# Patient Record
Sex: MALE | Race: BLACK OR AFRICAN AMERICAN | Employment: UNEMPLOYED | URBAN - METROPOLITAN AREA
[De-identification: names, ages, dates, MRNs, and addresses within clinical notes are randomized per-mention and may not be internally consistent; named-entity substitution may affect disease eponyms.]

---

## 2024-05-18 ENCOUNTER — HOSPITAL ENCOUNTER (EMERGENCY)
Age: 29
Discharge: HOME | End: 2024-05-19
Attending: EMERGENCY MEDICINE

## 2024-05-18 VITALS
HEART RATE: 102 BPM | TEMPERATURE: 97.4 F | RESPIRATION RATE: 26 BRPM | SYSTOLIC BLOOD PRESSURE: 157 MMHG | OXYGEN SATURATION: 97 % | DIASTOLIC BLOOD PRESSURE: 94 MMHG

## 2024-05-18 DIAGNOSIS — T78.40XA ALLERGIC REACTION, INITIAL ENCOUNTER: Primary | ICD-10-CM

## 2024-05-18 PROCEDURE — 2580000003 HC RX 258: Performed by: PHYSICIAN ASSISTANT

## 2024-05-18 PROCEDURE — 96372 THER/PROPH/DIAG INJ SC/IM: CPT

## 2024-05-18 PROCEDURE — 2500000003 HC RX 250 WO HCPCS: Performed by: PHYSICIAN ASSISTANT

## 2024-05-18 PROCEDURE — 96374 THER/PROPH/DIAG INJ IV PUSH: CPT

## 2024-05-18 PROCEDURE — 6360000002 HC RX W HCPCS: Performed by: PHYSICIAN ASSISTANT

## 2024-05-18 PROCEDURE — 96375 TX/PRO/DX INJ NEW DRUG ADDON: CPT

## 2024-05-18 PROCEDURE — 6360000002 HC RX W HCPCS

## 2024-05-18 PROCEDURE — 99284 EMERGENCY DEPT VISIT MOD MDM: CPT

## 2024-05-18 RX ORDER — BENZOCAINE/MENTHOL 6 MG-10 MG
LOZENGE MUCOUS MEMBRANE ONCE
Status: DISCONTINUED | OUTPATIENT
Start: 2024-05-18 | End: 2024-05-19 | Stop reason: HOSPADM

## 2024-05-18 RX ORDER — FAMOTIDINE 10 MG/ML
INJECTION, SOLUTION INTRAVENOUS
Status: DISCONTINUED
Start: 2024-05-18 | End: 2024-05-19 | Stop reason: HOSPADM

## 2024-05-18 RX ORDER — METHYLPREDNISOLONE SODIUM SUCCINATE 125 MG/2ML
INJECTION, POWDER, LYOPHILIZED, FOR SOLUTION INTRAMUSCULAR; INTRAVENOUS
Status: DISCONTINUED
Start: 2024-05-18 | End: 2024-05-19 | Stop reason: HOSPADM

## 2024-05-18 RX ORDER — 0.9 % SODIUM CHLORIDE 0.9 %
1000 INTRAVENOUS SOLUTION INTRAVENOUS ONCE
Status: COMPLETED | OUTPATIENT
Start: 2024-05-18 | End: 2024-05-18

## 2024-05-18 RX ORDER — WATER 10 ML/10ML
INJECTION INTRAMUSCULAR; INTRAVENOUS; SUBCUTANEOUS
Status: DISCONTINUED
Start: 2024-05-18 | End: 2024-05-19 | Stop reason: HOSPADM

## 2024-05-18 RX ORDER — DIPHENHYDRAMINE HYDROCHLORIDE 50 MG/ML
50 INJECTION INTRAMUSCULAR; INTRAVENOUS ONCE
Status: COMPLETED | OUTPATIENT
Start: 2024-05-18 | End: 2024-05-18

## 2024-05-18 RX ORDER — EPINEPHRINE 1 MG/ML
INJECTION, SOLUTION INTRAMUSCULAR; SUBCUTANEOUS
Status: COMPLETED
Start: 2024-05-18 | End: 2024-05-18

## 2024-05-18 RX ORDER — EPINEPHRINE 1 MG/ML
0.3 INJECTION, SOLUTION INTRAMUSCULAR; SUBCUTANEOUS ONCE
Status: DISCONTINUED | OUTPATIENT
Start: 2024-05-18 | End: 2024-05-19 | Stop reason: HOSPADM

## 2024-05-18 RX ORDER — EPINEPHRINE 1 MG/ML
0.3 INJECTION, SOLUTION INTRAMUSCULAR; SUBCUTANEOUS ONCE
Status: COMPLETED | OUTPATIENT
Start: 2024-05-18 | End: 2024-05-18

## 2024-05-18 RX ORDER — EPINEPHRINE 0.1 MG/ML
INJECTION INTRAVENOUS
Status: DISCONTINUED
Start: 2024-05-18 | End: 2024-05-19 | Stop reason: WASHOUT

## 2024-05-18 RX ORDER — DIPHENHYDRAMINE HYDROCHLORIDE 50 MG/ML
INJECTION INTRAMUSCULAR; INTRAVENOUS
Status: COMPLETED
Start: 2024-05-18 | End: 2024-05-18

## 2024-05-18 RX ADMIN — SODIUM CHLORIDE 1000 ML: 9 INJECTION, SOLUTION INTRAVENOUS at 22:37

## 2024-05-18 RX ADMIN — DIPHENHYDRAMINE HYDROCHLORIDE 50 MG: 50 INJECTION INTRAMUSCULAR; INTRAVENOUS at 22:35

## 2024-05-18 RX ADMIN — DIPHENHYDRAMINE HYDROCHLORIDE 50 MG: 50 INJECTION, SOLUTION INTRAMUSCULAR; INTRAVENOUS at 22:35

## 2024-05-18 RX ADMIN — EPINEPHRINE 0.3 MG: 1 INJECTION INTRAMUSCULAR; INTRAVENOUS; SUBCUTANEOUS at 22:32

## 2024-05-18 RX ADMIN — WATER 125 MG: 1 INJECTION INTRAMUSCULAR; INTRAVENOUS; SUBCUTANEOUS at 22:35

## 2024-05-18 RX ADMIN — EPINEPHRINE 0.3 MG: 1 INJECTION, SOLUTION INTRAMUSCULAR; SUBCUTANEOUS at 22:32

## 2024-05-18 RX ADMIN — SODIUM CHLORIDE, PRESERVATIVE FREE 20 MG: 5 INJECTION INTRAVENOUS at 22:33

## 2024-05-18 ASSESSMENT — LIFESTYLE VARIABLES
HOW MANY STANDARD DRINKS CONTAINING ALCOHOL DO YOU HAVE ON A TYPICAL DAY: PATIENT DOES NOT DRINK
HOW OFTEN DO YOU HAVE A DRINK CONTAINING ALCOHOL: NEVER

## 2024-05-19 RX ORDER — DIPHENHYDRAMINE HCL 25 MG
25 CAPSULE ORAL EVERY 4 HOURS PRN
Qty: 20 CAPSULE | Refills: 0 | Status: SHIPPED | OUTPATIENT
Start: 2024-05-19 | End: 2024-05-29

## 2024-05-19 RX ORDER — EPINEPHRINE 0.3 MG/.3ML
0.3 INJECTION SUBCUTANEOUS ONCE
Qty: 0.3 ML | Refills: 0 | Status: SHIPPED | OUTPATIENT
Start: 2024-05-19 | End: 2024-05-19

## 2024-05-19 RX ORDER — FAMOTIDINE 20 MG/1
20 TABLET, FILM COATED ORAL 2 TIMES DAILY
Qty: 60 TABLET | Refills: 0 | Status: SHIPPED | OUTPATIENT
Start: 2024-05-19

## 2024-05-19 RX ORDER — PREDNISONE 10 MG/1
60 TABLET ORAL DAILY
Qty: 30 TABLET | Refills: 0 | Status: SHIPPED | OUTPATIENT
Start: 2024-05-19 | End: 2024-05-24

## 2024-05-19 NOTE — ED PROVIDER NOTES
[facility] EMERGENCY DEPARTMENT  EMERGENCY DEPARTMENT ENCOUNTER        Pt Name: Sabas Fernandez  MRN: [id number]  Birthdate 1995  Date of evaluation: 5/18/2024  Provider: Fifi Moore PA-C  PCP: No primary care provider on file.  Note Started: 12:11 AM EDT 5/19/24       I have seen and evaluated this patient with my supervising physician Akil Woodall MD.      CHIEF COMPLAINT       Chief Complaint   Patient presents with    Allergic Reaction     Pt came in from home, pt presents with rash and hives all over body, pt unsure of what they are allergic too.       HISTORY OF PRESENT ILLNESS: 1 or more Elements     History From: Patient  Limitations to history : None    Sabas Fernandez is a 29 y.o. male who presents to the emergency department today for evaluation for concerns of an allergic reaction.  The patient states that approximate 2 hours before arriving to the ED, he states that he noticed a rash throughout his entire body.  The patient reports that the rash is itchy although nonpainful.  Patient denies any chest pain or shortness of breath but since arriving to the ED he feels that his throat is now getting \"tight\".  Patient denies any drooling, difficulty swallowing or voice changes.  He denies any new soaps, detergents or medications.  He denies any history of any allergies.  He has no nausea or vomiting, no other complaint    Nursing Notes were all reviewed and agreed with or any disagreements were addressed in the HPI.    REVIEW OF SYSTEMS :      Review of Systems   Constitutional:  Negative for activity change, appetite change, chills and fever.   HENT:  Negative for congestion, rhinorrhea, trouble swallowing and voice change.    Respiratory:  Negative for cough and shortness of breath.    Cardiovascular:  Negative for chest pain.   Gastrointestinal:  Negative for abdominal pain, diarrhea, nausea and vomiting.   Genitourinary:  Negative for difficulty urinating, dysuria and

## 2024-05-20 ENCOUNTER — HOSPITAL ENCOUNTER (EMERGENCY)
Facility: HOSPITAL | Age: 29
Discharge: HOME | End: 2024-05-21
Attending: EMERGENCY MEDICINE | Admitting: EMERGENCY MEDICINE
Payer: COMMERCIAL

## 2024-05-20 VITALS
DIASTOLIC BLOOD PRESSURE: 73 MMHG | TEMPERATURE: 97.4 F | BODY MASS INDEX: 28.14 KG/M2 | WEIGHT: 190 LBS | OXYGEN SATURATION: 97 % | RESPIRATION RATE: 22 BRPM | HEIGHT: 69 IN | HEART RATE: 81 BPM | SYSTOLIC BLOOD PRESSURE: 130 MMHG

## 2024-05-20 DIAGNOSIS — L25.3 CHEMICAL DERMATITIS: Primary | ICD-10-CM

## 2024-05-20 DIAGNOSIS — Z87.09 HISTORY OF ASTHMA: ICD-10-CM

## 2024-05-20 DIAGNOSIS — R21 EXCORIATED RASH: ICD-10-CM

## 2024-05-20 PROCEDURE — 99283 EMERGENCY DEPT VISIT LOW MDM: CPT

## 2024-05-20 RX ORDER — PREDNISONE 20 MG/1
40 TABLET ORAL ONCE
Status: COMPLETED | OUTPATIENT
Start: 2024-05-21 | End: 2024-05-21

## 2024-05-20 RX ORDER — FAMOTIDINE 20 MG/1
20 TABLET, FILM COATED ORAL ONCE
Status: COMPLETED | OUTPATIENT
Start: 2024-05-21 | End: 2024-05-21

## 2024-05-20 RX ORDER — DIPHENHYDRAMINE HCL 25 MG
25 CAPSULE ORAL ONCE
Status: COMPLETED | OUTPATIENT
Start: 2024-05-21 | End: 2024-05-21

## 2024-05-20 NOTE — Clinical Note
[facility] EMERGENCY DEPARTMENT  [address]  [facility] [address]  Phone: [phone]    Daniel Nassar was seen and treated in our emergency department on 5/20/2024.  He may return to work on 05/23/2024.         Thank you for choosing [facility].    Valentine Pedro PA-C

## 2024-05-21 PROCEDURE — 63710000001 PREDNISONE PER 1 MG: Performed by: PHYSICIAN ASSISTANT

## 2024-05-21 PROCEDURE — 63710000001 DIPHENHYDRAMINE PER 50 MG: Performed by: PHYSICIAN ASSISTANT

## 2024-05-21 RX ORDER — DIPHENHYDRAMINE HCL 25 MG
25 TABLET ORAL EVERY 6 HOURS PRN
Qty: 30 TABLET | Refills: 0 | Status: SHIPPED | OUTPATIENT
Start: 2024-05-21

## 2024-05-21 RX ORDER — METHYLPREDNISOLONE 4 MG/1
TABLET ORAL
Qty: 21 TABLET | Refills: 0 | Status: SHIPPED | OUTPATIENT
Start: 2024-05-21

## 2024-05-21 RX ORDER — FAMOTIDINE 20 MG/1
20 TABLET, FILM COATED ORAL 2 TIMES DAILY
Qty: 14 TABLET | Refills: 0 | Status: SHIPPED | OUTPATIENT
Start: 2024-05-21

## 2024-05-21 RX ADMIN — DIPHENHYDRAMINE HYDROCHLORIDE 25 MG: 25 CAPSULE ORAL at 00:07

## 2024-05-21 RX ADMIN — PREDNISONE 40 MG: 20 TABLET ORAL at 00:07

## 2024-05-21 RX ADMIN — FAMOTIDINE 20 MG: 20 TABLET, FILM COATED ORAL at 00:07

## 2024-05-21 NOTE — DISCHARGE INSTRUCTIONS
Patient continues to have dry, excoriated skin.  Question whether this is related to epoxy from his job or continued use of bleach to the skin.  Strongly recommend patient discontinue cleaning his skin with bleach.  Use Dove or yellow antibacterial Dial and use Aquaphor over-the-counter to help moisturize the excoriated, dry skin.  Rx for Benadryl 25 mg by mouth every 4-6 hours as needed for itching, Rx for Pepcid 20 mg by mouth twice daily as well as Rx for Medrol Dosepak as directed.  We gave to phone numbers for dermatologist here in [place] for close recheck.  Wear protective gloves and care while at work at all times in the future.  Return to the ER for any worsening symptoms.

## 2024-05-21 NOTE — ED PROVIDER NOTES
"Subjective   History of Present Illness  This is a 29-year-old male that presents the ER with generalized rash and skin irritation for the last 4 days.  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  He says that he wears gloves at all times, but he believes that he may have gotten exposed to the chemicals.  After he gets done working with the epoxy, he uses bleach to \"clean my skin\".  He feels like he is having an allergic reaction to the epoxy.  He was first seen and evaluated at [facility] emergency room in [place] on 5/18/2024.  He was prescribed an EpiPen as well as Benadryl, Pepcid, and Medrol Dosepak.  Patient says that he did not get these medications filled.  He has continued to clean his skin with straight Clorox bleach.  His skin is extremely dry and excoriated and cracking.  Patient says that he is itching all over.  There is no confluent erythema or warmth.  Patient has not worked since he had the questionable chemical exposure.  He does have personal history of asthma but he denies any wheezing or chest tightness or shortness of breath.  No other concerns at this time and past medical history is otherwise negative other than patient smokes marijuana.    History provided by:  Patient  Rash  Location:  Full body  Quality: dryness, painful and peeling    Quality comment:  Skin is cracked and excoriated.  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  He has been cleaning his skin with bleach and diluted with water.  He has been applying it daily.  Skin is excoriated, cracking, and very irritated.  Relieved by:  " Nothing  Worsened by:  Contact (Patient initially thought that epoxy which she works with in his occupation cause skin irritation, but patient continues to use bleach on his skin directly without diluting the Clorox with water)  Ineffective treatments:  None tried (Patient has not picked up prescriptions from [facility] from 5/18/2024 of Benadryl, Pepcid, EpiPen, and Medrol Dosepak)  Associated symptoms: no abdominal pain, no diarrhea, no fatigue, no fever, no headaches, no hoarse voice, no induration, no joint pain, no myalgias, no nausea, no periorbital edema, no shortness of breath, no sore throat, no throat swelling, no tongue swelling, no URI, not vomiting and not wheezing        Review of Systems   Constitutional: Negative.  Negative for activity change, appetite change, chills, diaphoresis, fatigue and fever.   HENT: Negative.  Negative for hoarse voice, sore throat, trouble swallowing and voice change.         No difficulty swallowing or voice changes.   Eyes: Negative.  Negative for visual disturbance.   Respiratory: Negative.  Negative for chest tightness, shortness of breath and wheezing.         History of asthma.  No wheezing or shortness of breath.  Patient reports smoking marijuana.   Cardiovascular: Negative.  Negative for chest pain, palpitations and leg swelling.   Gastrointestinal:  Negative for abdominal pain, diarrhea, nausea and vomiting.   Genitourinary: Negative.    Musculoskeletal: Negative.  Negative for arthralgias, joint swelling and myalgias.   Skin:  Positive for rash (Excoriated skin with dryness, peeling, and cracking.  No confluent erythema or warmth.).   Neurological: Negative.  Negative for headaches.   Psychiatric/Behavioral:  The patient is nervous/anxious.    All other systems reviewed and are negative.      Past Medical History:   Diagnosis Date    Asthma        No Known Allergies    History reviewed. No pertinent surgical history.    History reviewed. No pertinent family  history.    Social History     Socioeconomic History    Marital status: Single           Objective   Physical Exam  Vitals and nursing note reviewed.   Constitutional:       General: He is not in acute distress.     Appearance: Normal appearance. He is not ill-appearing, toxic-appearing or diaphoretic.      Comments: No acute sign of pain or distress.  Nontoxic   HENT:      Head: Normocephalic and atraumatic.      Nose: Nose normal.      Mouth/Throat:      Mouth: Mucous membranes are moist.      Pharynx: Oropharynx is clear. No pharyngeal swelling, oropharyngeal exudate, posterior oropharyngeal erythema or uvula swelling.      Comments: Oral mucous membranes are moist.  Posterior pharynx is nonerythematous.  No uvula swelling.  No difficulty handling secretions.  No swelling to the face.  Eyes:      Extraocular Movements: Extraocular movements intact.      Conjunctiva/sclera: Conjunctivae normal.      Pupils: Pupils are equal, round, and reactive to light.   Cardiovascular:      Rate and Rhythm: Normal rate and regular rhythm. No extrasystoles are present.     Pulses: Normal pulses.      Heart sounds: Normal heart sounds.      Comments: Regular rate and rhythm.  No ectopy.  No pedal edema to lower extremities  Pulmonary:      Effort: Pulmonary effort is normal. No tachypnea, accessory muscle usage or retractions.      Breath sounds: Normal breath sounds. No decreased breath sounds or wheezing.      Comments: Regular respiratory effort.  Good air exchange to bilateral lung fields.  No wheezes or stridor.  Abdominal:      General: Bowel sounds are normal.      Palpations: Abdomen is soft.   Musculoskeletal:         General: Normal range of motion.      Cervical back: Normal range of motion and neck supple.      Right lower leg: No edema.      Left lower leg: No edema.   Skin:     General: Skin is warm and dry.      Comments: I do not appreciate a particular rash to the skin.  Patient's skin is extremely dry and  excoriated and there are cracks in the skin as well as small superficial wounds, which appear to more than likely be from repetitive use of Clorox.  Patient has been applying bleach to the skin directly without diluting it with water.  I do not appreciate any confluent erythema, warmth, or purulent drainage.  There are no vesicles or induration or concern for abscess.   Neurological:      General: No focal deficit present.      Mental Status: He is alert.   Psychiatric:         Mood and Affect: Mood is anxious.         Speech: Speech normal.         Behavior: Behavior normal. Behavior is cooperative.         Thought Content: Thought content normal.         Cognition and Memory: Cognition normal.         Judgment: Judgment normal.      Comments: Anxious on exam         Procedures           ED Course  ED Course as of 05/21/24 0002   Mon May 20, 2024   2356 Vital signs and exam are stable.  Patient definitely has chemical dermatitis, but I am not sure if this is due to exposure to epoxy from work or repetitive use of Clorox to the skin that is not diluted with water.  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  He still believes he may have been exposed to the chemicals 4 days ago.  He continues to use bleach to the skin.  He was seen at [facility] and I reviewed the ER paperwork that he had at the bedside today.  He was prescribed Pepcid, Benadryl, Medrol Dosepak, and EpiPen 2 pack.  Patient says he did not get these filled because he does not live in [place].  I advised that we will prescribe Medrol Dosepak, Benadryl, and Pepcid at [facility].  Strongly recommend patient discontinue cleaning skin with Clorox.  He needs to use Dove soap or yellow antibacterial Dial.  He also needs to use Aquaphor to help moisturize the skin since he has significantly dry the skin out.  He verbalized understanding.  We also will give dermatology follow-up with the [place] or dermatology  "[facility]ants of [place].  Return to the ER if worsening symptoms. [FC]      ED Course User Index  [FC] Valentine Pedro PA-C                                      No results found for this or any previous visit (from the past 24 hour(s)).  Note: In addition to lab results from this visit, the labs listed above may include labs taken at another facility or during a different encounter within the last 24 hours. Please correlate lab times with ED admission and discharge times for further clarification of the services performed during this visit.    No orders to display     Vitals:    05/20/24 2236 05/20/24 2305 05/20/24 2307 05/20/24 2330   BP: 120/80 130/73     BP Location: Left arm      Patient Position: Sitting      Pulse: 106  77 81   Resp: 22      Temp: 97.4 °F (36.3 °C)      TempSrc: Oral      SpO2: 96%  97% 97%   Weight: 86.2 kg (190 lb)      Height: 175.3 cm (69\")        Medications   famotidine (PEPCID) tablet 20 mg (has no administration in time range)   predniSONE (DELTASONE) tablet 40 mg (has no administration in time range)   diphenhydrAMINE (BENADRYL) capsule 25 mg (has no administration in time range)     ECG/EMG Results (last 24 hours)       ** No results found for the last 24 hours. **          No orders to display              Medical Decision Making  Risk  Prescription drug management.        Final diagnoses:   Chemical dermatitis   Excoriated rash   History of asthma       ED Disposition  ED Disposition       ED Disposition   Discharge    Condition   Stable    Comment   --               [facility]  [address]  [facility] [address]  [phone]  Call in 1 day  Call tomorrow for close recheck    [facility]  [address]  [facility] [address]  [phone]  Call in 1 day  [facility]    [facility] EMERGENCY DEPARTMENT  [address]  [facility] " [id number] [phone]    If symptoms worsen         Medication List        New Prescriptions      diphenhydrAMINE 25 MG tablet  Commonly known as: BENADRYL  Take 1 tablet by mouth Every 6 (Six) Hours As Needed for Itching or Allergies.     famotidine 20 MG tablet  Commonly known as: PEPCID  Take 1 tablet by mouth 2 (Two) Times a Day.     methylPREDNISolone 4 MG dose pack  Commonly known as: MEDROL  Take as directed on package instructions.               Where to Get Your Medications        These medications were sent to [id number] - [place], [place] - [address] AT [facility] [address] - [phone]  - [phone] FX  [address] [facility] [address]      Phone: [phone]   diphenhydrAMINE 25 MG tablet  famotidine 20 MG tablet  methylPREDNISolone 4 MG dose pack            Valentine Pedro PA-C  05/21/24 0002